# Patient Record
Sex: FEMALE | Race: OTHER | NOT HISPANIC OR LATINO
[De-identification: names, ages, dates, MRNs, and addresses within clinical notes are randomized per-mention and may not be internally consistent; named-entity substitution may affect disease eponyms.]

---

## 2017-01-25 ENCOUNTER — MESSAGE (OUTPATIENT)
Age: 37
End: 2017-01-25

## 2017-01-26 ENCOUNTER — MESSAGE (OUTPATIENT)
Age: 37
End: 2017-01-26

## 2017-01-31 ENCOUNTER — APPOINTMENT (OUTPATIENT)
Dept: NEUROLOGY | Facility: CLINIC | Age: 37
End: 2017-01-31

## 2017-05-02 ENCOUNTER — APPOINTMENT (OUTPATIENT)
Dept: NEUROLOGY | Facility: CLINIC | Age: 37
End: 2017-05-02

## 2017-05-02 ENCOUNTER — MESSAGE (OUTPATIENT)
Age: 37
End: 2017-05-02

## 2017-08-03 ENCOUNTER — APPOINTMENT (OUTPATIENT)
Dept: NEUROLOGY | Facility: CLINIC | Age: 37
End: 2017-08-03

## 2017-08-10 ENCOUNTER — APPOINTMENT (OUTPATIENT)
Dept: NEUROLOGY | Facility: CLINIC | Age: 37
End: 2017-08-10
Payer: MEDICAID

## 2017-08-10 PROCEDURE — 64642 CHEMODENERV 1 EXTREMITY 1-4: CPT

## 2017-08-10 PROCEDURE — 99214 OFFICE O/P EST MOD 30 MIN: CPT | Mod: 25

## 2017-11-15 ENCOUNTER — APPOINTMENT (OUTPATIENT)
Dept: NEUROLOGY | Facility: CLINIC | Age: 37
End: 2017-11-15
Payer: MEDICAID

## 2017-11-15 PROCEDURE — 64642 CHEMODENERV 1 EXTREMITY 1-4: CPT

## 2017-11-15 PROCEDURE — 99214 OFFICE O/P EST MOD 30 MIN: CPT | Mod: 25

## 2018-02-08 ENCOUNTER — MEDICATION RENEWAL (OUTPATIENT)
Age: 38
End: 2018-02-08

## 2018-02-14 ENCOUNTER — APPOINTMENT (OUTPATIENT)
Dept: NEUROLOGY | Facility: CLINIC | Age: 38
End: 2018-02-14
Payer: MEDICAID

## 2018-02-14 PROCEDURE — 99215 OFFICE O/P EST HI 40 MIN: CPT | Mod: 25

## 2018-02-14 PROCEDURE — 64642 CHEMODENERV 1 EXTREMITY 1-4: CPT

## 2018-05-15 ENCOUNTER — APPOINTMENT (OUTPATIENT)
Dept: NEUROLOGY | Facility: CLINIC | Age: 38
End: 2018-05-15
Payer: MEDICAID

## 2018-05-15 PROCEDURE — 99214 OFFICE O/P EST MOD 30 MIN: CPT | Mod: 25

## 2018-05-15 PROCEDURE — 64642 CHEMODENERV 1 EXTREMITY 1-4: CPT

## 2018-08-17 ENCOUNTER — APPOINTMENT (OUTPATIENT)
Dept: NEUROLOGY | Facility: CLINIC | Age: 38
End: 2018-08-17
Payer: MEDICAID

## 2018-08-17 PROCEDURE — 99214 OFFICE O/P EST MOD 30 MIN: CPT | Mod: 25

## 2018-08-17 PROCEDURE — 64642 CHEMODENERV 1 EXTREMITY 1-4: CPT

## 2018-11-15 ENCOUNTER — APPOINTMENT (OUTPATIENT)
Dept: NEUROLOGY | Facility: CLINIC | Age: 38
End: 2018-11-15
Payer: MEDICAID

## 2018-11-15 PROCEDURE — 99214 OFFICE O/P EST MOD 30 MIN: CPT | Mod: 25

## 2018-11-15 PROCEDURE — 64642 CHEMODENERV 1 EXTREMITY 1-4: CPT

## 2019-02-19 ENCOUNTER — APPOINTMENT (OUTPATIENT)
Dept: NEUROLOGY | Facility: CLINIC | Age: 39
End: 2019-02-19
Payer: MEDICAID

## 2019-02-19 PROCEDURE — 64642 CHEMODENERV 1 EXTREMITY 1-4: CPT

## 2019-02-19 PROCEDURE — 99214 OFFICE O/P EST MOD 30 MIN: CPT | Mod: 25

## 2019-02-19 NOTE — END OF VISIT
[>50% of Time Spent on Counseling and Coordination of Care for  ___] : Greater than 50% of the encounter time was spent on counseling and coordination of care for [unfilled] [Time Spent: ___ minutes] : I have spent [unfilled] minutes of face to face time with the patient [FreeTextEntry1] : Sharlene

## 2019-02-19 NOTE — HISTORY OF PRESENT ILLNESS
[FreeTextEntry1] : Patient is a 38 year old right handed woman with hand tremor with a good response from last Botox injections. No side effects, no erythema or redness. Lasted about three months, but for 2 weeks had finger extension weakness.

## 2019-02-19 NOTE — DISCUSSION/SUMMARY
[FreeTextEntry1] : In summary, Mrs. Bryant is a 38 year old right handed woman with hand tremors who comes for follow up Botox injections. She states it worked well last time with no side effects. lasted about three months. The examination was remarkable for bilateral postural tremor, R>L. Botox injections were done as per procedure note. Patient tolerated well the procedure. Follow up in 3 months, sooner if new problems arise.  \par They are thinking of having another child. There is a strong family hx of tremor, so I cannot rule out that the child may get it. As for Btx, will have to check with company.

## 2019-02-19 NOTE — REASON FOR VISIT
[Follow-Up: _____] : a [unfilled] follow-up visit [Family Member] : family member [FreeTextEntry1] : for essential tremor and repeat Botox injections

## 2019-02-19 NOTE — PROCEDURE
[FreeTextEntry1] : The patient received injections with botulinum toxin A (botox), diluted at 5 units/0.1 cc via a 30 ga needle into the following sites, in the usual antiseptic manner. Possible side effects including over-weakening, dysphagia, bleeding, and local infection were discussed.\par \par right extensor carpi radialis muscle: 5/1\par right extensor carpi ulnaris muscle:  5/1\par \par Total injected = 10, Discarded = 90, Total used = 100\par The patient tolerated the procedure well, with not complications

## 2019-02-19 NOTE — PHYSICAL EXAM
[General Appearance - Alert] : alert [General Appearance - In No Acute Distress] : in no acute distress [General Appearance - Well Developed] : well developed [Oriented To Time, Place, And Person] : oriented to person, place, and time [Impaired Insight] : insight and judgment were intact [Affect] : the affect was normal [Person] : oriented to person [Place] : oriented to place [Time] : oriented to time [Motor Handedness Right-Handed] : the patient is right hand dominant [FreeTextEntry1] : Mild bilateral postural tremor mostly in R>L

## 2019-02-19 NOTE — REVIEW OF SYSTEMS
[As Noted in HPI] : as noted in HPI [Negative] : Psychiatric [Feeling Poorly] : not feeling poorly [Hand Weakness] : no hand weakness

## 2019-05-09 ENCOUNTER — MEDICATION RENEWAL (OUTPATIENT)
Age: 39
End: 2019-05-09

## 2019-05-24 ENCOUNTER — APPOINTMENT (OUTPATIENT)
Dept: NEUROLOGY | Facility: CLINIC | Age: 39
End: 2019-05-24
Payer: MEDICAID

## 2019-05-24 PROCEDURE — 99214 OFFICE O/P EST MOD 30 MIN: CPT | Mod: 25

## 2019-05-24 PROCEDURE — 64642 CHEMODENERV 1 EXTREMITY 1-4: CPT

## 2019-05-24 NOTE — DISCUSSION/SUMMARY
[FreeTextEntry1] : In summary, Mrs. Bryant is a 39 year old right handed woman with hand tremors who comes for follow up Botox injections. She states it worked well last time with no side effects. lasted about three months. The examination was remarkable for bilateral postural tremor, R>L. Botox injections were done as per procedure note. Patient tolerated well the procedure. Follow up in 3 months, sooner if new problems arise.  \par \par

## 2019-05-24 NOTE — REVIEW OF SYSTEMS
[Feeling Poorly] : not feeling poorly [As Noted in HPI] : as noted in HPI [Hand Weakness] : no hand weakness [Negative] : Psychiatric

## 2019-05-24 NOTE — HISTORY OF PRESENT ILLNESS
[FreeTextEntry1] : Patient is a 39 year old right handed woman with hand tremor with a good response from last Botox injections. No side effects, no erythema or redness. Lasted about three months

## 2019-05-24 NOTE — PHYSICAL EXAM
[General Appearance - Alert] : alert [General Appearance - Well Developed] : well developed [General Appearance - In No Acute Distress] : in no acute distress [Oriented To Time, Place, And Person] : oriented to person, place, and time [Affect] : the affect was normal [Impaired Insight] : insight and judgment were intact [Person] : oriented to person [Place] : oriented to place [Time] : oriented to time [Motor Handedness Right-Handed] : the patient is right hand dominant [FreeTextEntry1] : Mild bilateral postural tremor mostly in R>L

## 2019-07-30 ENCOUNTER — RESULT REVIEW (OUTPATIENT)
Age: 39
End: 2019-07-30

## 2019-08-28 ENCOUNTER — APPOINTMENT (OUTPATIENT)
Dept: NEUROLOGY | Facility: CLINIC | Age: 39
End: 2019-08-28
Payer: MEDICAID

## 2019-08-28 VITALS
HEART RATE: 81 BPM | SYSTOLIC BLOOD PRESSURE: 126 MMHG | HEIGHT: 62 IN | WEIGHT: 135 LBS | BODY MASS INDEX: 24.84 KG/M2 | DIASTOLIC BLOOD PRESSURE: 84 MMHG

## 2019-08-28 PROCEDURE — 64642 CHEMODENERV 1 EXTREMITY 1-4: CPT

## 2019-08-28 PROCEDURE — 99214 OFFICE O/P EST MOD 30 MIN: CPT | Mod: 25

## 2019-12-04 ENCOUNTER — APPOINTMENT (OUTPATIENT)
Dept: NEUROLOGY | Facility: CLINIC | Age: 39
End: 2019-12-04
Payer: MEDICAID

## 2019-12-04 PROCEDURE — 99214 OFFICE O/P EST MOD 30 MIN: CPT | Mod: 25

## 2019-12-04 PROCEDURE — 64642 CHEMODENERV 1 EXTREMITY 1-4: CPT

## 2019-12-04 NOTE — END OF VISIT
[] : Fellow [Fellow] : Fellow [FreeTextEntry2] : I was present during the entire procedure and assisted the Fellow where needed\par  [FreeTextEntry1] : Sharlene

## 2019-12-04 NOTE — HISTORY OF PRESENT ILLNESS
[FreeTextEntry1] : Patient is a 39 year old right handed woman with hand tremor with a good response from last Botox injections. No side effects, no erythema or redness. Lasted about three months. Her tremors are less and her writing has improved as well.

## 2019-12-04 NOTE — PHYSICAL EXAM
[General Appearance - Alert] : alert [General Appearance - Well Developed] : well developed [General Appearance - In No Acute Distress] : in no acute distress [Oriented To Time, Place, And Person] : oriented to person, place, and time [Affect] : the affect was normal [Impaired Insight] : insight and judgment were intact [Person] : oriented to person [Place] : oriented to place [Time] : oriented to time [Motor Handedness Right-Handed] : the patient is right hand dominant [FreeTextEntry1] : Mild bilateral postural tremor mostly in R>L, but since injections, right tremors are less than left.

## 2020-03-04 ENCOUNTER — APPOINTMENT (OUTPATIENT)
Dept: SPINE | Facility: CLINIC | Age: 40
End: 2020-03-04
Payer: MEDICAID

## 2020-03-04 ENCOUNTER — APPOINTMENT (OUTPATIENT)
Dept: NEUROLOGY | Facility: CLINIC | Age: 40
End: 2020-03-04
Payer: MEDICAID

## 2020-03-04 VITALS
OXYGEN SATURATION: 97 % | HEART RATE: 77 BPM | BODY MASS INDEX: 30 KG/M2 | TEMPERATURE: 98 F | WEIGHT: 163 LBS | SYSTOLIC BLOOD PRESSURE: 131 MMHG | HEIGHT: 62 IN | DIASTOLIC BLOOD PRESSURE: 79 MMHG

## 2020-03-04 PROCEDURE — 64642 CHEMODENERV 1 EXTREMITY 1-4: CPT

## 2020-03-04 PROCEDURE — 99204 OFFICE O/P NEW MOD 45 MIN: CPT

## 2020-03-04 PROCEDURE — 99214 OFFICE O/P EST MOD 30 MIN: CPT | Mod: 25

## 2020-03-04 RX ORDER — TRIAMCINOLONE ACETONIDE 1 MG/G
0.1 CREAM TOPICAL
Refills: 0 | Status: DISCONTINUED | COMMUNITY
Start: 2017-08-22 | End: 2020-03-04

## 2020-03-04 RX ORDER — PRIMIDONE 50 MG/1
50 TABLET ORAL TWICE DAILY
Qty: 90 | Refills: 3 | Status: DISCONTINUED | COMMUNITY
Start: 2017-05-02 | End: 2020-03-04

## 2020-03-04 NOTE — PHYSICAL EXAM
[General Appearance - Alert] : alert [General Appearance - In No Acute Distress] : in no acute distress [General Appearance - Well Developed] : well developed [Oriented To Time, Place, And Person] : oriented to person, place, and time [Impaired Insight] : insight and judgment were intact [Affect] : the affect was normal [Person] : oriented to person [Place] : oriented to place [Time] : oriented to time [Motor Handedness Right-Handed] : the patient is right hand dominant [FreeTextEntry1] : Mild bilateral postural tremor mostly in R>L, but since injections, right tremors are less than left.

## 2020-03-04 NOTE — DISCUSSION/SUMMARY
[FreeTextEntry1] : In summary, Mrs. Bryant is a 39 year old right handed woman with hand tremors who comes for follow up Botox injections. She states it worked well last time with no side effects. Lasted about three months. The examination was remarkable for bilateral postural tremor, R>L, more left now that she has been receiving botox injections. Botox injections were done as per procedure note. Patient tolerated well the procedure. Follow up in 3 months, sooner if new problems arise.  \par \par

## 2020-03-13 ENCOUNTER — APPOINTMENT (OUTPATIENT)
Dept: MRI IMAGING | Facility: CLINIC | Age: 40
End: 2020-03-13
Payer: MEDICAID

## 2020-03-13 ENCOUNTER — OUTPATIENT (OUTPATIENT)
Dept: OUTPATIENT SERVICES | Facility: HOSPITAL | Age: 40
LOS: 1 days | End: 2020-03-13
Payer: MEDICAID

## 2020-03-13 DIAGNOSIS — D32.9 BENIGN NEOPLASM OF MENINGES, UNSPECIFIED: ICD-10-CM

## 2020-03-13 PROCEDURE — A9585: CPT

## 2020-03-13 PROCEDURE — 70553 MRI BRAIN STEM W/O & W/DYE: CPT | Mod: 26

## 2020-03-13 PROCEDURE — 70553 MRI BRAIN STEM W/O & W/DYE: CPT

## 2020-04-01 ENCOUNTER — APPOINTMENT (OUTPATIENT)
Dept: SPINE | Facility: CLINIC | Age: 40
End: 2020-04-01
Payer: MEDICAID

## 2020-04-01 PROCEDURE — 99213 OFFICE O/P EST LOW 20 MIN: CPT | Mod: 95

## 2020-04-01 NOTE — REASON FOR VISIT
[Follow-Up: _____] : a [unfilled] follow-up visit [FreeTextEntry1] : The patient is being seen via telehealth for review of a new MRI for surveillance of a foramen magnum meningioma.

## 2020-05-19 ENCOUNTER — APPOINTMENT (OUTPATIENT)
Dept: NEUROLOGY | Facility: CLINIC | Age: 40
End: 2020-05-19

## 2020-06-10 ENCOUNTER — APPOINTMENT (OUTPATIENT)
Dept: NEUROLOGY | Facility: CLINIC | Age: 40
End: 2020-06-10
Payer: MEDICAID

## 2020-06-10 VITALS — TEMPERATURE: 97.8 F

## 2020-06-10 PROCEDURE — 64642 CHEMODENERV 1 EXTREMITY 1-4: CPT

## 2020-06-10 PROCEDURE — 99214 OFFICE O/P EST MOD 30 MIN: CPT | Mod: 25

## 2020-06-10 NOTE — PHYSICAL EXAM
[Person] : oriented to person [Place] : oriented to place [Motor Handedness Right-Handed] : the patient is right hand dominant [Time] : oriented to time [FreeTextEntry1] : Mild bilateral postural tremor mostly in R>L, but since injections, right tremors are less than left. No myoclonus (including startle myoclonus) seen. Gait normal.

## 2020-06-10 NOTE — HISTORY OF PRESENT ILLNESS
[FreeTextEntry1] : Patient is a 40 year old right handed woman with hand tremor with a good response from last Botox injections. No side effects, no erythema or redness. Lasted about three months. Her tremors are less and her writing has improved as well.\par Her foramen magnum meningioma is stable. for past three weeks she has occasional whole body jerks when sitting (watching TV and such).

## 2020-06-10 NOTE — REASON FOR VISIT
[Family Member] : family member [Follow-Up: _____] : a [unfilled] follow-up visit [FreeTextEntry1] : for essential tremor and repeat Botox injections

## 2020-06-10 NOTE — DISCUSSION/SUMMARY
[FreeTextEntry1] : In summary, Mrs. Bryant is a 39 year old right handed woman with hand tremors who comes for follow up Botox injections. She states it worked well last time with no side effects. Lasted about three months. The examination was remarkable for bilateral postural tremor, R>L, more left now that she has been receiving botox injections. Botox injections were done as per procedure note. Patient tolerated well the procedure. Follow up in 3 months, sooner if new problems arise. As for the myoclonus, exam normal, will monitor\par \par We discussed the above impression, plan and recommendations during the visit. Counseling represented more then 50% of the 30 minute visit time\par \par \par

## 2020-09-08 ENCOUNTER — APPOINTMENT (OUTPATIENT)
Dept: NEUROLOGY | Facility: CLINIC | Age: 40
End: 2020-09-08
Payer: MEDICAID

## 2020-09-08 VITALS — TEMPERATURE: 97.2 F

## 2020-09-08 PROCEDURE — 64642 CHEMODENERV 1 EXTREMITY 1-4: CPT

## 2020-09-08 PROCEDURE — 99213 OFFICE O/P EST LOW 20 MIN: CPT | Mod: 25

## 2020-09-08 NOTE — HISTORY OF PRESENT ILLNESS
[FreeTextEntry1] : Patient is a 40 year old right handed woman with hand tremor with a good response from last Botox injections. No side effects, no erythema or redness. Lasted about three months. Her tremors are less and her writing has improved as well.\par \par Her foramen magnum meningioma is stable.

## 2020-09-08 NOTE — PHYSICAL EXAM
[Person] : oriented to person [Place] : oriented to place [Time] : oriented to time [Motor Handedness Right-Handed] : the patient is right hand dominant [FreeTextEntry1] : Mild bilateral postural tremor mostly in R>L, but since injections, right tremors are less than left. No myoclonus (including startle myoclonus) seen. Gait normal.

## 2020-09-08 NOTE — DISCUSSION/SUMMARY
[FreeTextEntry1] : In summary, Mrs. Bryant is a 40 year old right handed woman with hand tremors who comes for follow up Botox injections. She states it worked well last time with no side effects. Lasted about three months. The examination was remarkable for bilateral postural tremor, R>L, more left now that she has been receiving botox injections. Botox injections were done as per procedure note. Patient tolerated well the procedure. Follow up in 3 months, sooner if new problems arise. As for the myoclonus, exam normal and seems to have improved, will continue to monitor\par \par We discussed the above impression, plan and recommendations during the visit. Counseling represented more then 50% of the 20 minute visit time\par \par \par

## 2020-11-02 ENCOUNTER — OUTPATIENT (OUTPATIENT)
Dept: OUTPATIENT SERVICES | Facility: HOSPITAL | Age: 40
LOS: 1 days | End: 2020-11-02
Payer: MEDICAID

## 2020-11-02 ENCOUNTER — APPOINTMENT (OUTPATIENT)
Dept: MRI IMAGING | Facility: CLINIC | Age: 40
End: 2020-11-02
Payer: MEDICAID

## 2020-11-02 ENCOUNTER — RESULT REVIEW (OUTPATIENT)
Age: 40
End: 2020-11-02

## 2020-11-02 DIAGNOSIS — D32.9 BENIGN NEOPLASM OF MENINGES, UNSPECIFIED: ICD-10-CM

## 2020-11-02 PROCEDURE — A9585: CPT

## 2020-11-02 PROCEDURE — 70553 MRI BRAIN STEM W/O & W/DYE: CPT

## 2020-11-02 PROCEDURE — 70553 MRI BRAIN STEM W/O & W/DYE: CPT | Mod: 26

## 2020-11-11 ENCOUNTER — APPOINTMENT (OUTPATIENT)
Dept: SPINE | Facility: CLINIC | Age: 40
End: 2020-11-11
Payer: MEDICAID

## 2020-11-11 VITALS
WEIGHT: 161 LBS | SYSTOLIC BLOOD PRESSURE: 130 MMHG | BODY MASS INDEX: 29.63 KG/M2 | HEART RATE: 82 BPM | DIASTOLIC BLOOD PRESSURE: 84 MMHG | HEIGHT: 62 IN | TEMPERATURE: 97.6 F

## 2020-11-11 PROCEDURE — 99072 ADDL SUPL MATRL&STAF TM PHE: CPT

## 2020-11-11 PROCEDURE — 99213 OFFICE O/P EST LOW 20 MIN: CPT

## 2020-11-12 NOTE — DATA REVIEWED
[de-identified] : Brain with and without contrast done at [facility] on 11/2/2020 and 3/13/2020 and [place] on 11/5/2019

## 2020-11-12 NOTE — PHYSICAL EXAM

## 2020-11-12 NOTE — ASSESSMENT
[FreeTextEntry1] : The images and findings were reviewed and discussed with the patient.  The patient is to return in one year with a new MRI of the brain with and without contrast for surveillance.

## 2020-11-12 NOTE — RESULTS/DATA
[FreeTextEntry1] : No significant change in the posterior foramen magnum meningioma which is seen better on the sagittal views done with contrast.

## 2020-11-12 NOTE — REASON FOR VISIT
[Follow-Up: _____] : a [unfilled] follow-up visit [FreeTextEntry1] : JAROD LOZANO is a 40 year old lady who presented with tinnitus and was found to have an incidentally found small posterior foramen magnum of meningioma. The patient stated that she remains with some right ear tinnitus. She also receives Botox injections by Dr. Boston Su to treat tremors in her hands.  She otherwise stated that she is feeling well.

## 2020-12-08 ENCOUNTER — APPOINTMENT (OUTPATIENT)
Dept: NEUROLOGY | Facility: CLINIC | Age: 40
End: 2020-12-08

## 2020-12-15 ENCOUNTER — APPOINTMENT (OUTPATIENT)
Dept: NEUROLOGY | Facility: CLINIC | Age: 40
End: 2020-12-15
Payer: MEDICAID

## 2020-12-15 VITALS — TEMPERATURE: 97.2 F

## 2020-12-15 PROCEDURE — 99072 ADDL SUPL MATRL&STAF TM PHE: CPT

## 2020-12-15 PROCEDURE — 99214 OFFICE O/P EST MOD 30 MIN: CPT | Mod: 25

## 2020-12-15 PROCEDURE — 64642 CHEMODENERV 1 EXTREMITY 1-4: CPT

## 2020-12-15 NOTE — DISCUSSION/SUMMARY
[FreeTextEntry1] : In summary, Mrs. Bryant is a 40 year old right handed woman with dystonia.\par \par Botox injections were done as per procedure note. Patient tolerated well the procedure. Follow up in 3 months, sooner if new problems arise. As for the myoclonus, exam normal and seems to have improved, will continue to monitor. Her meningioma also has been stable. \par \par We discussed the above impression, plan and recommendations during the visit. Counseling represented more then 50% of the 30 minute visit time

## 2020-12-15 NOTE — HISTORY OF PRESENT ILLNESS
[FreeTextEntry1] : Patient is a 40 year old right handed woman with hand tremor with a good response from last Botox injections. No side effects, no erythema or redness. Lasted about three months. Her tremors are less and her writing has improved as well.\par \par Her foramen magnum meningioma is stable on recent imaging.

## 2021-03-17 ENCOUNTER — APPOINTMENT (OUTPATIENT)
Dept: NEUROLOGY | Facility: CLINIC | Age: 41
End: 2021-03-17
Payer: MEDICAID

## 2021-03-17 VITALS
BODY MASS INDEX: 29.63 KG/M2 | SYSTOLIC BLOOD PRESSURE: 116 MMHG | HEIGHT: 62 IN | DIASTOLIC BLOOD PRESSURE: 79 MMHG | WEIGHT: 161 LBS | HEART RATE: 84 BPM

## 2021-03-17 VITALS — TEMPERATURE: 97.4 F

## 2021-03-17 PROCEDURE — 99214 OFFICE O/P EST MOD 30 MIN: CPT | Mod: 25

## 2021-03-17 PROCEDURE — 99072 ADDL SUPL MATRL&STAF TM PHE: CPT

## 2021-03-17 PROCEDURE — 64642 CHEMODENERV 1 EXTREMITY 1-4: CPT

## 2021-03-17 NOTE — HISTORY OF PRESENT ILLNESS
[FreeTextEntry1] : Patient is a 40 year old right handed woman with hand tremor with a good response from last Botox injections. \par \par No side effects, no erythema or redness. Lasted about three months. Her tremors are less and her writing has improved as well.\par \par Her foramen magnum meningioma is stable on recent imaging.

## 2021-03-17 NOTE — PHYSICAL EXAM
Urinary Tract Infection in Women: Care Instructions  Your Care Instructions    A urinary tract infection, or UTI, is a general term for an infection anywhere between the kidneys and the urethra (where urine comes out). Most UTIs are bladder infections. They often cause pain or burning when you urinate. UTIs are caused by bacteria and can be cured with antibiotics. Be sure to complete your treatment so that the infection goes away. Follow-up care is a key part of your treatment and safety. Be sure to make and go to all appointments, and call your doctor if you are having problems. It's also a good idea to know your test results and keep a list of the medicines you take. How can you care for yourself at home? · Take your antibiotics as directed. Do not stop taking them just because you feel better. You need to take the full course of antibiotics. · Drink extra water and other fluids for the next day or two. This may help wash out the bacteria that are causing the infection. (If you have kidney, heart, or liver disease and have to limit fluids, talk with your doctor before you increase your fluid intake.)  · Avoid drinks that are carbonated or have caffeine. They can irritate the bladder. · Urinate often. Try to empty your bladder each time. · To relieve pain, take a hot bath or lay a heating pad set on low over your lower belly or genital area. Never go to sleep with a heating pad in place. To prevent UTIs  · Drink plenty of water each day. This helps you urinate often, which clears bacteria from your system. (If you have kidney, heart, or liver disease and have to limit fluids, talk with your doctor before you increase your fluid intake.)  · Urinate when you need to. · Urinate right after you have sex. · Change sanitary pads often. · Avoid douches, bubble baths, feminine hygiene sprays, and other feminine hygiene products that have deodorants.   · After going to the bathroom, wipe from front to back.  When should you call for help? Call your doctor now or seek immediate medical care if:    · Symptoms such as fever, chills, nausea, or vomiting get worse or appear for the first time.     · You have new pain in your back just below your rib cage. This is called flank pain.     · There is new blood or pus in your urine.     · You have any problems with your antibiotic medicine.    Watch closely for changes in your health, and be sure to contact your doctor if:    · You are not getting better after taking an antibiotic for 2 days.     · Your symptoms go away but then come back. Where can you learn more? Go to http://mona-chele.info/. Enter Y936 in the search box to learn more about \"Urinary Tract Infection in Women: Care Instructions. \"  Current as of: May 12, 2017  Content Version: 11.7  © 2185-6485 [employer]. Care instructions adapted under license by [employer] (which disclaims liability or warranty for this information). If you have questions about a medical condition or this instruction, always ask your healthcare professional. [address] any warranty or liability for your use of this information. [Person] : oriented to person [Place] : oriented to place [Time] : oriented to time [Motor Handedness Right-Handed] : the patient is right hand dominant [FreeTextEntry1] : Mild bilateral postural tremor mostly in R>L, but since injections, right tremors are less than left. No myoclonus (including startle myoclonus) seen. Gait normal.

## 2021-06-23 ENCOUNTER — APPOINTMENT (OUTPATIENT)
Dept: NEUROLOGY | Facility: CLINIC | Age: 41
End: 2021-06-23
Payer: MEDICAID

## 2021-06-23 PROCEDURE — 64642 CHEMODENERV 1 EXTREMITY 1-4: CPT

## 2021-06-23 PROCEDURE — 99213 OFFICE O/P EST LOW 20 MIN: CPT | Mod: 25

## 2021-06-23 NOTE — DISCUSSION/SUMMARY
[FreeTextEntry1] : In summary, Mrs. Bryant is a 41 year old right handed woman with dystonia.\par \par Botox injections were done as per procedure note. Patient tolerated well the procedure. Follow up in 3 months, sooner if new problems arise. As for the myoclonus, exam normal and seems to have improved, will continue to monitor. Her meningioma also has been stable. \par \par We discussed the above impression, plan and recommendations during the visit. Counseling represented more then 50% of the 20 minute visit time

## 2021-08-11 ENCOUNTER — RX RENEWAL (OUTPATIENT)
Age: 41
End: 2021-08-11

## 2021-10-06 ENCOUNTER — APPOINTMENT (OUTPATIENT)
Dept: NEUROLOGY | Facility: CLINIC | Age: 41
End: 2021-10-06
Payer: MEDICAID

## 2021-10-06 PROCEDURE — 64642 CHEMODENERV 1 EXTREMITY 1-4: CPT

## 2021-10-06 PROCEDURE — 99213 OFFICE O/P EST LOW 20 MIN: CPT | Mod: 25

## 2021-10-06 NOTE — HISTORY OF PRESENT ILLNESS
[FreeTextEntry1] : Patient is a 41 year old right handed woman with hand tremor with a good response from last Botox injections. \par \par Last visit she had some finger weakness, did resolve after 2 weeks (this has happened before). no erythema or redness. Lasted about three months. Her tremors are less and her writing has improved as well.\par \par Her foramen magnum meningioma is stable on recent imaging.

## 2021-10-14 ENCOUNTER — OUTPATIENT (OUTPATIENT)
Dept: OUTPATIENT SERVICES | Facility: HOSPITAL | Age: 41
LOS: 1 days | End: 2021-10-14
Payer: MEDICAID

## 2021-10-14 ENCOUNTER — APPOINTMENT (OUTPATIENT)
Dept: MRI IMAGING | Facility: CLINIC | Age: 41
End: 2021-10-14

## 2021-10-14 DIAGNOSIS — D32.9 BENIGN NEOPLASM OF MENINGES, UNSPECIFIED: ICD-10-CM

## 2021-10-14 PROCEDURE — 70553 MRI BRAIN STEM W/O & W/DYE: CPT

## 2021-10-14 PROCEDURE — 70553 MRI BRAIN STEM W/O & W/DYE: CPT | Mod: 26

## 2021-10-27 ENCOUNTER — APPOINTMENT (OUTPATIENT)
Dept: SPINE | Facility: CLINIC | Age: 41
End: 2021-10-27
Payer: MEDICAID

## 2021-10-27 VITALS
WEIGHT: 163 LBS | HEART RATE: 89 BPM | HEIGHT: 62 IN | OXYGEN SATURATION: 97 % | BODY MASS INDEX: 30 KG/M2 | DIASTOLIC BLOOD PRESSURE: 89 MMHG | SYSTOLIC BLOOD PRESSURE: 131 MMHG

## 2021-10-27 PROCEDURE — 99213 OFFICE O/P EST LOW 20 MIN: CPT

## 2021-11-17 ENCOUNTER — APPOINTMENT (OUTPATIENT)
Dept: SPINE | Facility: CLINIC | Age: 41
End: 2021-11-17

## 2021-12-31 ENCOUNTER — EMERGENCY (EMERGENCY)
Facility: HOSPITAL | Age: 41
LOS: 1 days | Discharge: HOME | End: 2021-12-31
Attending: EMERGENCY MEDICINE | Admitting: EMERGENCY MEDICINE
Payer: OTHER MISCELLANEOUS

## 2021-12-31 VITALS
DIASTOLIC BLOOD PRESSURE: 78 MMHG | RESPIRATION RATE: 16 BRPM | TEMPERATURE: 98 F | HEART RATE: 81 BPM | SYSTOLIC BLOOD PRESSURE: 142 MMHG | OXYGEN SATURATION: 99 %

## 2021-12-31 VITALS
HEART RATE: 88 BPM | HEIGHT: 62 IN | RESPIRATION RATE: 18 BRPM | SYSTOLIC BLOOD PRESSURE: 134 MMHG | DIASTOLIC BLOOD PRESSURE: 82 MMHG | TEMPERATURE: 98 F | OXYGEN SATURATION: 99 %

## 2021-12-31 PROCEDURE — 73090 X-RAY EXAM OF FOREARM: CPT | Mod: 26,RT

## 2021-12-31 PROCEDURE — 99284 EMERGENCY DEPT VISIT MOD MDM: CPT

## 2021-12-31 PROCEDURE — 72100 X-RAY EXAM L-S SPINE 2/3 VWS: CPT | Mod: 26

## 2021-12-31 RX ORDER — ACETAMINOPHEN 500 MG
650 TABLET ORAL ONCE
Refills: 0 | Status: COMPLETED | OUTPATIENT
Start: 2021-12-31 | End: 2021-12-31

## 2021-12-31 RX ORDER — LIDOCAINE 4 G/100G
1 CREAM TOPICAL ONCE
Refills: 0 | Status: COMPLETED | OUTPATIENT
Start: 2021-12-31 | End: 2021-12-31

## 2021-12-31 RX ADMIN — Medication 650 MILLIGRAM(S): at 17:52

## 2021-12-31 RX ADMIN — LIDOCAINE 1 PATCH: 4 CREAM TOPICAL at 16:34

## 2021-12-31 RX ADMIN — Medication 650 MILLIGRAM(S): at 16:33

## 2021-12-31 NOTE — ED PROVIDER NOTE - PHYSICAL EXAMINATION
pt sitting up, able to stand and  walk in RW without any difficulty  mmm. no sign of head/facial trauma  normal S1-S2  No resp distress. able to speak in full and clear sentences. no wheeze, rales or stridor.  soft nontender abdomen. no  rebound. no guarding. no sign of trauma. no CVAT  stable pelvis  nontender cervical/thoracic spine. tender on the lower lumbar spine and on the right para-lumbar spine. no step off. no bruise or ecchymosis of back  no lac of arms/face  nl gait  nl  bl hands  tender on the right forearm w no deformity or swelling

## 2021-12-31 NOTE — ED ADULT NURSE NOTE - OBJECTIVE STATEMENT
Pt awake, alert and oriented x 4 c/o back pain since shelf fell on her today.   no numbness/tingling, no neuro deficits, ambulatory.     meds given.

## 2021-12-31 NOTE — ED PROVIDER NOTE - OBJECTIVE STATEMENT
40yo F denies sig pmhx pw pain of back after a shelf fell this morning. pt was hanging a shelf w glass candles, it fell, states it hit her back. She twisted,  without falling. Has been walking. Endorse pain in lower back and right arm. NO head trauma. no neck pain. no cp/sob no abdominal pain. no numbness or weakness. no hip pain. no AC. no urinary complaints.

## 2021-12-31 NOTE — ED ADULT TRIAGE NOTE - CHIEF COMPLAINT QUOTE
Patient brought to ER by EMS from work and a shelf fell and landed on her left hip/ C/O left hip and back pain. Took Tyl 500mg

## 2021-12-31 NOTE — ED PROVIDER NOTE - PATIENT PORTAL LINK FT
You can access the FollowMyHealth Patient Portal offered by [facility] by registering at the following website: [email or web address]. By joining [facility]’s FollowMyHealth portal, you will also be able to view your health information using other applications (apps) compatible with our system.

## 2022-02-02 ENCOUNTER — APPOINTMENT (OUTPATIENT)
Dept: NEUROLOGY | Facility: CLINIC | Age: 42
End: 2022-02-02
Payer: MEDICAID

## 2022-02-02 VITALS
SYSTOLIC BLOOD PRESSURE: 146 MMHG | DIASTOLIC BLOOD PRESSURE: 86 MMHG | HEIGHT: 62 IN | HEART RATE: 118 BPM | BODY MASS INDEX: 30 KG/M2 | OXYGEN SATURATION: 97 % | WEIGHT: 163 LBS

## 2022-02-02 PROCEDURE — 99213 OFFICE O/P EST LOW 20 MIN: CPT | Mod: 25

## 2022-02-02 PROCEDURE — 64642 CHEMODENERV 1 EXTREMITY 1-4: CPT

## 2022-02-02 NOTE — HISTORY OF PRESENT ILLNESS
[FreeTextEntry1] : Patient is a 41 year old right handed woman with hand tremor with a good response from last Botox injections. \par \par Last visit no finger weakness (this has happened before). no erythema or redness. Lasted about three months. Her tremors are less and her writing has improved as well.\par \par Her foramen magnum meningioma is stable on recent imaging.

## 2022-05-18 ENCOUNTER — APPOINTMENT (OUTPATIENT)
Dept: NEUROLOGY | Facility: CLINIC | Age: 42
End: 2022-05-18
Payer: MEDICAID

## 2022-05-18 PROCEDURE — 99213 OFFICE O/P EST LOW 20 MIN: CPT | Mod: 25

## 2022-05-18 PROCEDURE — 64642 CHEMODENERV 1 EXTREMITY 1-4: CPT

## 2022-05-18 NOTE — HISTORY OF PRESENT ILLNESS
[FreeTextEntry1] : Patient is a 42 year old right handed woman with hand tremor with a good response from last Botox injections. \par \par Last visit no finger weakness (this has happened before). No erythema or redness. Lasted about three months. Her tremors are less and her writing has improved as well.\par \par Her foramen magnum meningioma is stable on recent imaging.

## 2022-05-18 NOTE — DISCUSSION/SUMMARY
[FreeTextEntry1] : In summary, Mrs. Bryant is a 42 year old right handed woman with dystonia.\par \par Botox injections were done as per procedure note. Patient tolerated well the procedure. Follow up in 3 months, sooner if new problems arise. As for the myoclonus, exam normal and seems to have improved, will continue to monitor. Her meningioma also has been stable. \par \par We discussed the above impression, plan and recommendations during the visit. Counseling represented more then 50% of the 20 minute visit time

## 2022-08-24 ENCOUNTER — APPOINTMENT (OUTPATIENT)
Dept: NEUROLOGY | Facility: CLINIC | Age: 42
End: 2022-08-24

## 2022-08-24 PROCEDURE — 99213 OFFICE O/P EST LOW 20 MIN: CPT | Mod: 25

## 2022-08-24 PROCEDURE — 64642 CHEMODENERV 1 EXTREMITY 1-4: CPT

## 2022-08-24 NOTE — HISTORY OF PRESENT ILLNESS
[FreeTextEntry1] : Patient is a 42 year old right handed woman with hand tremor with a good response from last Botox injections. \par \par Last visit no finger weakness (this has happened before). No erythema or redness. Lasted about three months. Her tremors are less and her writing has improved as well.\par \par Her foramen magnum meningioma is stable on recent imaging. Has been getting more migraines, got Ajovy, which is working.

## 2022-08-24 NOTE — DISCUSSION/SUMMARY
[FreeTextEntry1] : In summary, Mrs. Bryant is a 42 year old right handed woman with dystonia.  Botox injections were done as per procedure note. Patient tolerated well the procedure. Follow up in 3 months, sooner if new problems arise. As for the myoclonus, exam normal and seems to have improved, will continue to monitor. Her meningioma also has been stable. H  We discussed the above impression, plan and recommendations during the visit. Counseling represented more then 50% of the 20 minute visit time

## 2022-10-18 ENCOUNTER — OUTPATIENT (OUTPATIENT)
Dept: OUTPATIENT SERVICES | Facility: HOSPITAL | Age: 42
LOS: 1 days | End: 2022-10-18
Payer: MEDICAID

## 2022-10-18 ENCOUNTER — APPOINTMENT (OUTPATIENT)
Dept: MRI IMAGING | Facility: CLINIC | Age: 42
End: 2022-10-18

## 2022-10-18 DIAGNOSIS — D32.9 BENIGN NEOPLASM OF MENINGES, UNSPECIFIED: ICD-10-CM

## 2022-10-18 PROCEDURE — 70553 MRI BRAIN STEM W/O & W/DYE: CPT

## 2022-10-18 PROCEDURE — A9585: CPT

## 2022-10-18 PROCEDURE — 70553 MRI BRAIN STEM W/O & W/DYE: CPT | Mod: 26

## 2022-10-26 ENCOUNTER — APPOINTMENT (OUTPATIENT)
Dept: SPINE | Facility: CLINIC | Age: 42
End: 2022-10-26

## 2022-10-26 VITALS
HEART RATE: 72 BPM | SYSTOLIC BLOOD PRESSURE: 145 MMHG | WEIGHT: 163 LBS | BODY MASS INDEX: 30 KG/M2 | OXYGEN SATURATION: 100 % | HEIGHT: 62 IN | DIASTOLIC BLOOD PRESSURE: 92 MMHG

## 2022-10-26 PROCEDURE — 99213 OFFICE O/P EST LOW 20 MIN: CPT

## 2022-11-29 ENCOUNTER — APPOINTMENT (OUTPATIENT)
Dept: NEUROLOGY | Facility: CLINIC | Age: 42
End: 2022-11-29

## 2022-11-29 PROCEDURE — 64642 CHEMODENERV 1 EXTREMITY 1-4: CPT

## 2022-11-29 PROCEDURE — 99213 OFFICE O/P EST LOW 20 MIN: CPT | Mod: 25

## 2022-11-29 NOTE — DISCUSSION/SUMMARY
[FreeTextEntry1] : In summary, Mrs. Bryant is a 42 year old right handed woman with dystonia.  Botox injections were done as per procedure note. Patient tolerated well the procedure. Follow up in 3 months, sooner if new problems arise.\par  As for the myoclonus, exam normal and seems to have improved, will continue to monitor. Her meningioma also has been stable. We discussed the above impression, plan and recommendations during the visit. \par \par Counseling represented more then 50% of the 20 minute visit time

## 2022-11-29 NOTE — HISTORY OF PRESENT ILLNESS
[FreeTextEntry1] : Patient is a 42 year old right handed woman with hand tremor with a good response from last Botox injections. \par \par Last visit no finger weakness (this has happened before). No erythema or redness. Lasted about three months. Her tremors are less and her writing has improved as well.\par \par Her foramen magnum meningioma is stable on recent imaging. Has been getting more migraines, got Ajovy, which is working. Given monthly by her .

## 2022-11-30 ENCOUNTER — APPOINTMENT (OUTPATIENT)
Dept: NEUROLOGY | Facility: CLINIC | Age: 42
End: 2022-11-30

## 2023-03-02 NOTE — PHYSICAL EXAM
Quality 110: Preventive Care And Screening: Influenza Immunization: Influenza immunization was not ordered or administered, reason not given [Person] : oriented to person Quality 431: Preventive Care And Screening: Unhealthy Alcohol Use - Screening: Patient not identified as an unhealthy alcohol user when screened for unhealthy alcohol use using a systematic screening method [Place] : oriented to place [Time] : oriented to time [Motor Handedness Right-Handed] : the patient is right hand dominant Quality 130: Documentation Of Current Medications In The Medical Record: Current Medications Documented [FreeTextEntry1] : Mild bilateral postural tremor mostly in R>L, but since injections, right tremors are less than left. No myoclonus (including startle myoclonus) seen. Gait normal.  Detail Level: Detailed Quality 226: Preventive Care And Screening: Tobacco Use: Screening And Cessation Intervention: Patient screened for tobacco use and is an ex/non-smoker

## 2023-03-22 ENCOUNTER — APPOINTMENT (OUTPATIENT)
Dept: NEUROLOGY | Facility: CLINIC | Age: 43
End: 2023-03-22
Payer: MEDICAID

## 2023-03-22 PROCEDURE — 64642 CHEMODENERV 1 EXTREMITY 1-4: CPT

## 2023-03-22 PROCEDURE — 99213 OFFICE O/P EST LOW 20 MIN: CPT | Mod: 25

## 2023-03-22 NOTE — HISTORY OF PRESENT ILLNESS
[FreeTextEntry1] : Patient is a 42 year old right handed woman with hand tremor with a good response from last Botox injections. \par \par Last visit no finger weakness (this has happened before). No erythema or redness. Lasted about three months. Her tremors are less and her writing has improved as well. Tremor is worse with anxiety. \par \par Her foramen magnum meningioma is stable on recent imaging. Has been getting more migraines, got Ajovy, which is working. Given monthly by her .

## 2023-04-06 RX ORDER — CITALOPRAM HYDROBROMIDE 10 MG/1
10 TABLET, FILM COATED ORAL DAILY
Qty: 30 | Refills: 5 | Status: DISCONTINUED | COMMUNITY
Start: 2023-03-22 | End: 2023-04-06

## 2023-07-05 ENCOUNTER — APPOINTMENT (OUTPATIENT)
Dept: NEUROLOGY | Facility: CLINIC | Age: 43
End: 2023-07-05
Payer: MEDICAID

## 2023-07-05 VITALS
SYSTOLIC BLOOD PRESSURE: 118 MMHG | BODY MASS INDEX: 29.44 KG/M2 | HEART RATE: 86 BPM | WEIGHT: 160 LBS | HEIGHT: 62 IN | DIASTOLIC BLOOD PRESSURE: 81 MMHG

## 2023-07-05 DIAGNOSIS — F32.A ANXIETY DISORDER, UNSPECIFIED: ICD-10-CM

## 2023-07-05 DIAGNOSIS — F41.9 ANXIETY DISORDER, UNSPECIFIED: ICD-10-CM

## 2023-07-05 PROCEDURE — 64642 CHEMODENERV 1 EXTREMITY 1-4: CPT

## 2023-07-05 PROCEDURE — 99213 OFFICE O/P EST LOW 20 MIN: CPT | Mod: 25

## 2023-07-05 NOTE — DISCUSSION/SUMMARY
[FreeTextEntry1] : In summary, Mrs. Bryant is a 42 year old right handed woman with dystonia.  Botox injections were done as per procedure note. Patient tolerated well the procedure. Follow up in 3 months, sooner if new problems arise.\par \par  As for the myoclonus, exam normal and seems to have improved, will continue to monitor. Her meningioma also has been stable. We discussed the above impression, plan and recommendations during the visit. \par \par As for migraines, renewed Ajovy \par \par Counseling represented more then 50% of the 20 minute visit time

## 2023-07-05 NOTE — HISTORY OF PRESENT ILLNESS
[FreeTextEntry1] : Patient is a 43 year old right handed woman with hand tremor with a good response from last Botox injections. \par \par Last visit no finger weakness (this has happened before). No erythema or redness. Lasted about three months. Her tremors are less and her writing has improved as well. Tremor is worse with anxiety. \par \par Her foramen magnum meningioma is stable on recent imaging. Has been getting more migraines, got Ajovy, which is working. Given monthly by her .\par \par Last injections worked, but for 1 month it hurt near the site, now still when holding cup or the like (she points to more midline than the injections).

## 2023-07-05 NOTE — PHYSICAL EXAM
[Person] : oriented to person [Place] : oriented to place [Time] : oriented to time [Motor Handedness Right-Handed] : the patient is right hand dominant [FreeTextEntry1] : Mild bilateral postural tremor mostly in R>L, but since injections, right tremors are less than left. No myoclonus (including startle myoclonus) seen. Gait normal. \par \par Mild pain to palpation right forearm, mildline near elbow

## 2023-08-01 ENCOUNTER — RESULT REVIEW (OUTPATIENT)
Age: 43
End: 2023-08-01

## 2023-08-01 ENCOUNTER — APPOINTMENT (OUTPATIENT)
Dept: NEUROLOGY | Facility: CLINIC | Age: 43
End: 2023-08-01
Payer: MEDICAID

## 2023-08-01 VITALS
WEIGHT: 160 LBS | BODY MASS INDEX: 29.44 KG/M2 | DIASTOLIC BLOOD PRESSURE: 77 MMHG | HEIGHT: 62 IN | SYSTOLIC BLOOD PRESSURE: 111 MMHG | HEART RATE: 67 BPM

## 2023-08-01 DIAGNOSIS — R56.9 UNSPECIFIED CONVULSIONS: ICD-10-CM

## 2023-08-01 PROCEDURE — 99214 OFFICE O/P EST MOD 30 MIN: CPT

## 2023-08-01 NOTE — PHYSICAL EXAM
[FreeTextEntry1] : General: left>right lateral tongue bite Constitutional: alert and in no acute distress.  Psychiatric: affect normal, insight and judgment intact. Neurologic:  Orientation: oriented to person, place, and time  Attention: normal concentrating ability and attention was not decreased.  Language: no difficulty naming common objects, repeating a phrase, writing a sentence; fluency, comprehension, and reading intact.  Fund of knowledge: displays adequate knowledge of personal past history.  Cranial Nerves: visual acuity intact bilaterally, visual fields full to confrontation, pupils equal round and reactive to light, extraocular motion intact, facial sensation intact symmetrically, face symmetrical, hearing was intact bilaterally, tongue and palate midline, head turning and shoulder shrug symmetric and there was no tongue deviation with protrusion.  Motor: muscle tone was normal in all four extremities, muscle strength was normal in all four extremities and normal bulk in all four extremities.  Coordination:. normal gait. balance was intact. there was no past-pointing. essential tremor present.  Deep tendon reflexes:  Biceps right 2+. Biceps left 2+.   Triceps right 2+. Triceps left 2+.   Brachioradialis right 2+. Brachioradialis left 2+.   Patella right 2+. Patella left 2+.   Ankle jerk right 2+. Ankle jerk left 2+.  Eyes: the sclera and conjunctiva were normal.  Neck: the appearance of the neck was normal.  Musculoskeletal: no clubbing or cyanosis of the fingernails.  Skin: no lesions, rash

## 2023-08-01 NOTE — HISTORY OF PRESENT ILLNESS
[FreeTextEntry1] : 43 F with essential tremor, anxiety, foramen magnum meningioma, migraines h/o myoclonus described x since 30s. Episode in [place], LOC, gurgling, tongue bite, found by sister in public BR. Second event with fever, with rolling eyes, tongue bite.  Possible convulsion second hand description.  On return to [facility] - on transfer had 3rd+additoinal GTCS events x3-5min observed by mali Godfrey MD. Sent to [place] ER. N/V.  Desat 89%, FSG normal on plane. Sleep deprived.  Labs: nl in ER 7/2023,  CT 7/2023 nl  MRI brain 10/2022  SocHx: no toxic, no TOB/ETOH   FmHx: Son Harika Claros with autism and seizures. Fa with tremor.  ROS:  anxiety, no depression  Rx: citalopram for anxiety 10mg; LEV 500mg bid new

## 2023-08-01 NOTE — DISCUSSION/SUMMARY
[Myoclonic] : myoclonic [Generalized] : generalized  [Idiopathic] : idiopathic  [Generalized or Multifocal] : generalized or multifocal [Risks Associated with D[place] Law] : As per my usual protocol, the patient was advised in regards to risks and driving privileges associated with the [place] Guidelines.  [Safety Recommendations] : The patient was advised in regards to the risk of seizures and general seizure safety recommendations including not to be bathing alone, climbing to high places and operating heavy machinery. [Compliance with Medications] : The importance of compliance with medications was reinforced. [Medication Side Effects] : High frequency and serious potential medication adverse effects were reviewed with the patient, including but not exclusive to psychiatric effects.  Information sheets on medication side effects were made available to the patient in our clinic.  The patient or advocate agrees to notify us for any concerns. [Sleep Hygiene/Sleep Disruption Risks] : Sleep hygiene and the risks of sleep disruption were discussed. [Risk of Death] : Risk of death associated with seizures / SUDEP was discussed. [FreeTextEntry1] : New onset GTCS/convulsions, in context prior myoclonus x 30's. Son with autism, epilepsy (seizure onset age 2 with fever). On LEV 500mg bid. Monitor mood.  Some h/o anxiety.  Sz precautions, No driving allowed -d/w pt / hus 1yr DMV recommendation. Safety discussed Med ADRs  x time 45min

## 2023-08-23 ENCOUNTER — APPOINTMENT (OUTPATIENT)
Dept: MRI IMAGING | Facility: CLINIC | Age: 43
End: 2023-08-23
Payer: MEDICAID

## 2023-08-23 ENCOUNTER — OUTPATIENT (OUTPATIENT)
Dept: OUTPATIENT SERVICES | Facility: HOSPITAL | Age: 43
LOS: 1 days | End: 2023-08-23
Payer: MEDICAID

## 2023-08-23 DIAGNOSIS — G40.309 GENERALIZED IDIOPATHIC EPILEPSY AND EPILEPTIC SYNDROMES, NOT INTRACTABLE, WITHOUT STATUS EPILEPTICUS: ICD-10-CM

## 2023-08-23 PROCEDURE — A9585: CPT

## 2023-08-23 PROCEDURE — 70553 MRI BRAIN STEM W/O & W/DYE: CPT | Mod: 26

## 2023-08-23 PROCEDURE — 70553 MRI BRAIN STEM W/O & W/DYE: CPT

## 2023-08-23 PROCEDURE — 76377 3D RENDER W/INTRP POSTPROCES: CPT

## 2023-09-21 ENCOUNTER — APPOINTMENT (OUTPATIENT)
Dept: NEUROLOGY | Facility: CLINIC | Age: 43
End: 2023-09-21
Payer: MEDICAID

## 2023-09-21 ENCOUNTER — NON-APPOINTMENT (OUTPATIENT)
Age: 43
End: 2023-09-21

## 2023-09-21 PROCEDURE — 95816 EEG AWAKE AND DROWSY: CPT

## 2023-09-22 PROCEDURE — 95708 EEG WO VID EA 12-26HR UNMNTR: CPT

## 2023-09-23 PROCEDURE — 95700 EEG CONT REC W/VID EEG TECH: CPT

## 2023-09-23 PROCEDURE — 95721 EEG PHY/QHP>36<60 HR W/O VID: CPT

## 2023-09-23 PROCEDURE — 95708 EEG WO VID EA 12-26HR UNMNTR: CPT

## 2023-11-01 ENCOUNTER — APPOINTMENT (OUTPATIENT)
Dept: SPINE | Facility: CLINIC | Age: 43
End: 2023-11-01
Payer: MEDICAID

## 2023-11-01 ENCOUNTER — APPOINTMENT (OUTPATIENT)
Dept: NEUROLOGY | Facility: CLINIC | Age: 43
End: 2023-11-01
Payer: MEDICAID

## 2023-11-01 VITALS
WEIGHT: 158 LBS | DIASTOLIC BLOOD PRESSURE: 76 MMHG | SYSTOLIC BLOOD PRESSURE: 118 MMHG | OXYGEN SATURATION: 100 % | HEART RATE: 89 BPM | HEIGHT: 62 IN | BODY MASS INDEX: 29.08 KG/M2

## 2023-11-01 VITALS
HEIGHT: 62 IN | BODY MASS INDEX: 29.08 KG/M2 | WEIGHT: 158 LBS | DIASTOLIC BLOOD PRESSURE: 84 MMHG | HEART RATE: 86 BPM | SYSTOLIC BLOOD PRESSURE: 123 MMHG

## 2023-11-01 PROCEDURE — 99212 OFFICE O/P EST SF 10 MIN: CPT

## 2023-11-01 PROCEDURE — 99213 OFFICE O/P EST LOW 20 MIN: CPT

## 2023-11-01 RX ORDER — LEVETIRACETAM 500 MG/1
500 TABLET, FILM COATED ORAL TWICE DAILY
Qty: 180 | Refills: 1 | Status: DISCONTINUED | COMMUNITY
Start: 2023-08-01 | End: 2023-11-01

## 2023-11-01 RX ORDER — LEVETIRACETAM 750 MG/1
750 TABLET, FILM COATED ORAL TWICE DAILY
Qty: 60 | Refills: 5 | Status: DISCONTINUED | COMMUNITY
Start: 2023-11-01 | End: 2023-11-01

## 2023-11-06 ENCOUNTER — APPOINTMENT (OUTPATIENT)
Dept: NEUROLOGY | Facility: CLINIC | Age: 43
End: 2023-11-06
Payer: MEDICAID

## 2023-11-06 VITALS — HEART RATE: 89 BPM | SYSTOLIC BLOOD PRESSURE: 114 MMHG | DIASTOLIC BLOOD PRESSURE: 80 MMHG

## 2023-11-06 VITALS — SYSTOLIC BLOOD PRESSURE: 114 MMHG | HEART RATE: 89 BPM | DIASTOLIC BLOOD PRESSURE: 80 MMHG

## 2023-11-06 PROCEDURE — 64642 CHEMODENERV 1 EXTREMITY 1-4: CPT

## 2023-11-06 PROCEDURE — 99214 OFFICE O/P EST MOD 30 MIN: CPT | Mod: 25

## 2024-01-17 ENCOUNTER — APPOINTMENT (OUTPATIENT)
Dept: NEUROLOGY | Facility: CLINIC | Age: 44
End: 2024-01-17

## 2024-01-29 ENCOUNTER — APPOINTMENT (OUTPATIENT)
Dept: NEUROLOGY | Facility: CLINIC | Age: 44
End: 2024-01-29

## 2024-01-30 RX ORDER — ONABOTULINUMTOXINA 100 [USP'U]/1
100 INJECTION, POWDER, LYOPHILIZED, FOR SOLUTION INTRADERMAL; INTRAMUSCULAR
Qty: 1 | Refills: 3 | Status: ACTIVE | COMMUNITY
Start: 2020-05-27 | End: 1900-01-01

## 2024-02-01 ENCOUNTER — NON-APPOINTMENT (OUTPATIENT)
Age: 44
End: 2024-02-01

## 2024-02-05 ENCOUNTER — APPOINTMENT (OUTPATIENT)
Dept: NEUROLOGY | Facility: CLINIC | Age: 44
End: 2024-02-05
Payer: MEDICAID

## 2024-02-05 ENCOUNTER — RX RENEWAL (OUTPATIENT)
Age: 44
End: 2024-02-05

## 2024-02-05 PROCEDURE — 99213 OFFICE O/P EST LOW 20 MIN: CPT

## 2024-02-05 PROCEDURE — G2211 COMPLEX E/M VISIT ADD ON: CPT | Mod: NC,1L

## 2024-02-05 NOTE — HISTORY OF PRESENT ILLNESS
[FreeTextEntry1] : UPDATE:  Ms Alexander Bryant is here today for a scheduled follow up office visit and is accompanied by her son (Deshawn) and father Recent convulsion 1/2024, per hus, amnestic, no tongue bite.  Denies missed Rx of late. Some stress. x1min  HA taking ajovy. better with Rx.  occ pain. No swaying when walking,sitting , cooking.  occ feels heavy sensation of head falling backwards while watching TV, no dizziness  She takes ASM at 10 am and 10pm  Around noon her symptoms intensify for 1-2hours Levetiracetam 500mg ER BID improved side effects   mood okay  HPI:  43 F with essential tremor, anxiety, foramen magnum meningioma, migraines h/o myoclonus described x since 30s. Episode in [place], LOC, gurgling, tongue bite, found by sister in public BR. Second event with fever, with rolling eyes, tongue bite.  Possible convulsion second hand description.  On return to [facility] - on transfer had 3rd+additoinal GTCS events x3-5min observed by mali Godfrey MD. Sent to [place] ER. Last 7/2023 in plane N/V.  Desat 89%, FSG normal on plane. Sleep deprived.  Labs: nl in ER 7/2023,  CT 7/2023 nl  MRI brain 10/2022  SocHx: no toxic, no TOB/ETOH   FmHx: Son Harika Claros with autism and seizures. Fa with tremor.  ROS:  anxiety, no depression  Rx: citalopram for anxiety 10mg; LEV 500mg bid new

## 2024-02-05 NOTE — DISCUSSION/SUMMARY
[FreeTextEntry1] : New onset GTCS/convulsions, in context prior myoclonus x 30's.   Son with autism, epilepsy (seizure onset age 2 with fever). On LEV 500mg bid baseline. Monitor mood.  Some h/o anxiety.  R/o ES vs PNES AEEG nl  Plan  switched  Levetiracetam to ER version 500 mg BID increase to 500/1000mg reviewed seizure risks/safety Proscribed driving [place] Follow  up in 4-6 months   [Myoclonic] : myoclonic [Generalized] : generalized  [Idiopathic] : idiopathic  [Generalized or Multifocal] : generalized or multifocal [Risks Associated with D[place] Law] : As per my usual protocol, the patient was advised in regards to risks and driving privileges associated with the [place] Guidelines.  [Safety Recommendations] : The patient was advised in regards to the risk of seizures and general seizure safety recommendations including not to be bathing alone, climbing to high places and operating heavy machinery. [Compliance with Medications] : The importance of compliance with medications was reinforced. [Medication Side Effects] : High frequency and serious potential medication adverse effects were reviewed with the patient, including but not exclusive to psychiatric effects.  Information sheets on medication side effects were made available to the patient in our clinic.  The patient or advocate agrees to notify us for any concerns. [Sleep Hygiene/Sleep Disruption Risks] : Sleep hygiene and the risks of sleep disruption were discussed. [Risk of Death] : Risk of death associated with seizures / SUDEP was discussed.

## 2024-02-14 ENCOUNTER — APPOINTMENT (OUTPATIENT)
Dept: NEUROLOGY | Facility: CLINIC | Age: 44
End: 2024-02-14
Payer: MEDICAID

## 2024-02-14 PROCEDURE — 64642 CHEMODENERV 1 EXTREMITY 1-4: CPT

## 2024-02-14 PROCEDURE — 99214 OFFICE O/P EST MOD 30 MIN: CPT | Mod: 25

## 2024-02-14 NOTE — HISTORY OF PRESENT ILLNESS
[FreeTextEntry1] : Patient is a 43 year old right handed woman with hand tremor with a good response from last Botox injections.   ET tremor since age of 32 -33 Strong family history of tremor : father grandmother Last visit no finger weakness (this has happened before). No erythema or redness. Lasted about three months. Her tremors are less and her writing has improved as well. Tremor is worse with anxiety.   Her foramen magnum meningioma is stable on recent imaging. Has been getting more migraines, got Ajovy, which is working. Given monthly by her . Since last visit had seizures, now being followed by epilepsy. Now on Keppra ER, seizure-free since. Most recent MRI in August 30, 2023 was stable.  she is currently taking  propanol 60mg ER for the tremor, she feels that helps with the tremor    Last injections worked, but for 1 month it hurt near the site, now still when holding cup or the like (she points to more midline than the injections).  ET is worse now

## 2024-02-14 NOTE — PROCEDURE
[FreeTextEntry1] : The patient received injections with botulinum toxin A (botox), diluted at 5 units/0.1 cc via a 30 ga needle into the following sites, in the usual antiseptic manner. Possible side effects including over-weakening, dysphagia, bleeding, and local infection were discussed.  right extensor carpi radialis muscle: 5/1 right extensor carpi ulnaris muscle:  5/1  Total injected = 10, Discarded = 90, Total used = 100 The patient tolerated the procedure well, with no complications

## 2024-02-14 NOTE — DISCUSSION/SUMMARY
[FreeTextEntry1] : In summary, Mrs. Bryant is a 43 year old right handed woman with dystonia.  1. Botox injections were done as per procedure note. Patient tolerated well the procedure. Follow up in 3 months, sooner if new problems arise. 2. As for the myoclonus, exam normal and seems to have improved, will continue to monitor. Her meningioma also has been stable. We discussed the above impression, plan and recommendations during the visit.  3. Follow with epilepsy 4. As for migraines, continue Ajovy.  5. Propranolol for ET.   Counseling represented more then 50% of the 30 minute visit time

## 2024-02-14 NOTE — PHYSICAL EXAM
[Person] : oriented to person [Place] : oriented to place [Time] : oriented to time [Motor Handedness Right-Handed] : the patient is right hand dominant [FreeTextEntry1] : Mild bilateral postural tremor mostly in R>L, but since injections, right tremors are less than left. No myoclonus (including startle myoclonus) seen. Gait normal.   Bilateral essential tremor in arms.

## 2024-05-13 ENCOUNTER — NON-APPOINTMENT (OUTPATIENT)
Age: 44
End: 2024-05-13

## 2024-06-04 RX ORDER — PROPRANOLOL HYDROCHLORIDE 60 MG/1
60 CAPSULE, EXTENDED RELEASE ORAL
Qty: 30 | Refills: 5 | Status: ACTIVE | COMMUNITY
Start: 2023-11-06 | End: 1900-01-01

## 2024-06-12 ENCOUNTER — APPOINTMENT (OUTPATIENT)
Dept: NEUROLOGY | Facility: CLINIC | Age: 44
End: 2024-06-12
Payer: MEDICAID

## 2024-06-12 VITALS
HEIGHT: 62 IN | HEART RATE: 68 BPM | DIASTOLIC BLOOD PRESSURE: 89 MMHG | WEIGHT: 168 LBS | SYSTOLIC BLOOD PRESSURE: 129 MMHG | BODY MASS INDEX: 30.91 KG/M2

## 2024-06-12 DIAGNOSIS — G25.0 ESSENTIAL TREMOR: ICD-10-CM

## 2024-06-12 DIAGNOSIS — G40.309 GENERALIZED IDIOPATHIC EPILEPSY AND EPILEPTIC SYNDROMES, NOT INTRACTABLE, W/OUT STATUS EPILEPTICUS: ICD-10-CM

## 2024-06-12 DIAGNOSIS — G25.3 MYOCLONUS: ICD-10-CM

## 2024-06-12 DIAGNOSIS — D32.9 BENIGN NEOPLASM OF MENINGES, UNSPECIFIED: ICD-10-CM

## 2024-06-12 DIAGNOSIS — G25.89 OTHER SPECIFIED EXTRAPYRAMIDAL AND MOVEMENT DISORDERS: ICD-10-CM

## 2024-06-12 PROCEDURE — 99214 OFFICE O/P EST MOD 30 MIN: CPT | Mod: 25

## 2024-06-12 PROCEDURE — 64642 CHEMODENERV 1 EXTREMITY 1-4: CPT

## 2024-06-12 RX ORDER — FREMANEZUMAB-VFRM 225 MG/1.5ML
225 INJECTION SUBCUTANEOUS
Qty: 1 | Refills: 5 | Status: ACTIVE | COMMUNITY
Start: 1900-01-01 | End: 1900-01-01

## 2024-06-12 RX ORDER — LEVETIRACETAM 500 MG/1
500 TABLET, FILM COATED, EXTENDED RELEASE ORAL
Qty: 270 | Refills: 1 | Status: ACTIVE | COMMUNITY
Start: 2023-11-01 | End: 1900-01-01

## 2024-06-12 NOTE — HISTORY OF PRESENT ILLNESS
[FreeTextEntry1] : Patient is a 44 year old right handed woman with hand tremor with a good response from last Botox injections.   ET tremor since age of 32 -33 Strong family history of tremor : father grandmother Last visit no finger weakness (this has happened before). No erythema or redness. Lasted about three months. Her tremors are less and her writing has improved as well. Tremor is worse with anxiety.   Her foramen magnum meningioma is stable on recent imaging. Has been getting more migraines, got Ajovy, which is working. Given monthly by her . Since last visit had seizures, now being followed by epilepsy. Now on Keppra ER, seizure-free since. Most recent MRI in August 30, 2023 was stable.  she is currently taking propanol 60mg ER for the tremor, she feels that helps with the tremor  Last injections worked, no pain this time 24

## 2024-06-12 NOTE — DISCUSSION/SUMMARY
[FreeTextEntry1] : In summary, Mrs. Bryant is a 43 year old right handed woman with dystonia.  1. Botox injections were done as per procedure note. Patient tolerated well the procedure. Follow up in 3 months, sooner if new problems arise. Propranolol is helping, will continue 2. As for the myoclonus, exam normal and seems to have improved, will continue to monitor. Her meningioma also has been stable. We discussed the above impression, plan and recommendations during the visit.  3. Follow with epilepsy 4. As for migraines, continue Ajovy.   We discussed the above impression, plan and recommendations during the visit. Counseling represented more then 50% of the 30 minute visit time.

## 2024-07-26 ENCOUNTER — APPOINTMENT (OUTPATIENT)
Dept: MRI IMAGING | Facility: CLINIC | Age: 44
End: 2024-07-26

## 2024-07-26 PROCEDURE — 70553 MRI BRAIN STEM W/O & W/DYE: CPT | Mod: 26

## 2024-09-11 ENCOUNTER — NON-APPOINTMENT (OUTPATIENT)
Age: 44
End: 2024-09-11

## 2024-09-18 ENCOUNTER — APPOINTMENT (OUTPATIENT)
Dept: NEUROLOGY | Facility: CLINIC | Age: 44
End: 2024-09-18

## 2024-10-01 ENCOUNTER — APPOINTMENT (OUTPATIENT)
Dept: NEUROLOGY | Facility: CLINIC | Age: 44
End: 2024-10-01
Payer: MEDICAID

## 2024-10-01 VITALS
HEIGHT: 62 IN | SYSTOLIC BLOOD PRESSURE: 127 MMHG | WEIGHT: 168 LBS | HEART RATE: 68 BPM | DIASTOLIC BLOOD PRESSURE: 91 MMHG | BODY MASS INDEX: 30.91 KG/M2

## 2024-10-01 DIAGNOSIS — G40.309 GENERALIZED IDIOPATHIC EPILEPSY AND EPILEPTIC SYNDROMES, NOT INTRACTABLE, W/OUT STATUS EPILEPTICUS: ICD-10-CM

## 2024-10-01 DIAGNOSIS — R56.9 UNSPECIFIED CONVULSIONS: ICD-10-CM

## 2024-10-01 PROCEDURE — G2211 COMPLEX E/M VISIT ADD ON: CPT | Mod: NC

## 2024-10-01 PROCEDURE — 99213 OFFICE O/P EST LOW 20 MIN: CPT

## 2024-10-01 NOTE — DISCUSSION/SUMMARY
[Myoclonic] : myoclonic [Generalized] : generalized  [Idiopathic] : idiopathic  [Generalized or Multifocal] : generalized or multifocal [[place]isks Associated with D[place] Law] : As per my usual protocol, the patient was advised in regards to risks and driving privileges associated with the [place] Guidelines.  [Safety Recommendations] : The patient was advised in regards to the risk of seizures and general seizure safety recommendations including not to be bathing alone, climbing to high places and operating heavy machinery. [Compliance with Medications] : The importance of compliance with medications was reinforced. [Medication Side Effects] : High frequency and serious potential medication adverse effects were reviewed with the patient, including but not exclusive to psychiatric effects.  Information sheets on medication side effects were made available to the patient in our clinic.  The patient or advocate agrees to notify us for any concerns. [Sleep Hygiene/Sleep Disruption Risks] : Sleep hygiene and the risks of sleep disruption were discussed. [Risk of Death] : Risk of death associated with seizures / SUDEP was discussed. [FreeTextEntry1] : New onset GTCS/convulsions, in context prior myoclonus x 30's.   Son with autism, epilepsy (seizure onset age 2 with fever). On LEV  Monitor mood, currently stable.   Some h/o anxiety.  R/o ES vs PNES (seems less likely) AEEG nl Exam normal except mild tremor  Plan  Levetiracetam to ER 500mg / 1000mg reviewed seizure risks/safety compliance reinforced Driving as per [facility] DMV clearance Labs/levels  Follow  up in 4-6 months

## 2024-10-01 NOTE — CONSULT LETTER
[Dear  ___] : Dear  [unfilled], [( Thank you for referring [unfilled] for consultation for _____ )] : Thank you for referring [unfilled] for consultation for [unfilled] [Please see my note below.] : Please see my note below. [Consult Closing:] : Thank you very much for allowing me to participate in the care of this patient.  If you have any questions, please do not hesitate to contact me. [Sincerely,] : Sincerely, [FreeTextEntry2] : Tosin Ortiz MD [facility] [id number] [id number] [FreeTextEntry3] : William Park MD, CRAIG Board Certified: Neurology, Clinical Neurophysiology, Epilepsy , Department of Neurology Epilepsy Fellowship  [facility] NPI: [id number]

## 2024-10-01 NOTE — HISTORY OF PRESENT ILLNESS
[FreeTextEntry1] : UPDATE:  Ms Alexander Bryant is here today for a scheduled follow up office visit  No further seizures Recent convulsion 1/2024, per hus, amnestic, no tongue bite.  Denies missed Rx of late. Some stress. x1min DMV cleared return to driving p 6mo. Levetiracetam 500mg ER BID improved side effects   mood okay She takes ASM at 10 am and 10pm  Around noon her symptoms intensify for 1-2hours  Still with buzzing in R ear and vertigo. Seeing ENT.  tried prn meclizine HA taking ajovy. better with Rx. occ pain. heaviness on top of head  ROS:  mood stable, sleep ok.  HPI:  44 F with essential tremor, anxiety, foramen magnum meningioma, migraines h/o myoclonus described x since 30s. Episode in [facility], LOC, gurgling, tongue bite, found by sister in public [place]. Second event with fever, with rolling eyes, tongue bite.  Possible convulsion second hand description.  On return to [facility] - on transfer had 3rd+additoinal GTCS events x3-5min observed by mali Godfrey MD. Sent to [place] ER. Last 7/2023 in plane N/V.  Desat 89%, FSG normal on plane. Sleep deprived.  Labs: nl in ER 7/2023,  CT 7/2023 nl  MRI brain 10/2022  SocHx: no toxic, no TOB/ETOH. part time   FmHx: Son Harika Claros with autism and seizures.  [place] older child in 20's. Fa with tremor.  ROS:  anxiety, no depression  Rx: citalopram for anxiety 10mg; LEV 500mg bid new

## 2024-10-04 LAB — LEVETIRACETAM SERPL-MCNC: 25.7 UG/ML

## 2024-10-11 ENCOUNTER — APPOINTMENT (OUTPATIENT)
Dept: NEUROLOGY | Facility: CLINIC | Age: 44
End: 2024-10-11

## 2024-11-06 ENCOUNTER — APPOINTMENT (OUTPATIENT)
Dept: NEUROLOGY | Facility: CLINIC | Age: 44
End: 2024-11-06
Payer: COMMERCIAL

## 2024-11-06 VITALS
HEART RATE: 71 BPM | HEIGHT: 62 IN | WEIGHT: 168 LBS | SYSTOLIC BLOOD PRESSURE: 118 MMHG | DIASTOLIC BLOOD PRESSURE: 84 MMHG | BODY MASS INDEX: 30.91 KG/M2

## 2024-11-06 DIAGNOSIS — G25.0 ESSENTIAL TREMOR: ICD-10-CM

## 2024-11-06 PROCEDURE — 99203 OFFICE O/P NEW LOW 30 MIN: CPT | Mod: 25

## 2024-11-06 PROCEDURE — 64642 CHEMODENERV 1 EXTREMITY 1-4: CPT

## 2024-11-14 ENCOUNTER — APPOINTMENT (OUTPATIENT)
Dept: OTOLARYNGOLOGY | Facility: CLINIC | Age: 44
End: 2024-11-14
Payer: COMMERCIAL

## 2024-11-14 VITALS
HEIGHT: 62 IN | WEIGHT: 170 LBS | DIASTOLIC BLOOD PRESSURE: 85 MMHG | SYSTOLIC BLOOD PRESSURE: 127 MMHG | BODY MASS INDEX: 31.28 KG/M2 | HEART RATE: 59 BPM

## 2024-11-14 DIAGNOSIS — H91.91 UNSPECIFIED HEARING LOSS, RIGHT EAR: ICD-10-CM

## 2024-11-14 DIAGNOSIS — H93.291 OTHER ABNORMAL AUDITORY PERCEPTIONS, RIGHT EAR: ICD-10-CM

## 2024-11-14 PROCEDURE — 99204 OFFICE O/P NEW MOD 45 MIN: CPT | Mod: 25

## 2024-11-14 PROCEDURE — 92504 EAR MICROSCOPY EXAMINATION: CPT

## 2024-12-03 DIAGNOSIS — G40.309 GENERALIZED IDIOPATHIC EPILEPSY AND EPILEPTIC SYNDROMES, NOT INTRACTABLE, W/OUT STATUS EPILEPTICUS: ICD-10-CM

## 2025-02-11 NOTE — ED PROVIDER NOTE - NS ED MD DISPO DISCHARGE CCDA
The patient location is: ***  The chief complaint leading to consultation is: ***    Visit type: {TELE AUDIOVISUAL:21501}    Face to Face time with patient: ***  *** minutes of total time spent on the encounter, which includes face to face time and non-face to face time preparing to see the patient (eg, review of tests), Obtaining and/or reviewing separately obtained history, Documenting clinical information in the electronic or other health record, Independently interpreting results (not separately reported) and communicating results to the patient/family/caregiver, or Care coordination (not separately reported).         Each patient to whom he or she provides medical services by telemedicine is:  (1) informed of the relationship between the physician and patient and the respective role of any other health care provider with respect to management of the patient; and (2) notified that he or she may decline to receive medical services by telemedicine and may withdraw from such care at any time.    Notes:             Patient/Caregiver provided printed discharge information.

## 2025-02-12 ENCOUNTER — NON-APPOINTMENT (OUTPATIENT)
Age: 45
End: 2025-02-12

## 2025-02-12 ENCOUNTER — APPOINTMENT (OUTPATIENT)
Dept: NEUROLOGY | Facility: CLINIC | Age: 45
End: 2025-02-12

## 2025-02-12 ENCOUNTER — APPOINTMENT (OUTPATIENT)
Dept: OBGYN | Facility: CLINIC | Age: 45
End: 2025-02-12
Payer: COMMERCIAL

## 2025-02-12 VITALS
HEART RATE: 73 BPM | DIASTOLIC BLOOD PRESSURE: 93 MMHG | SYSTOLIC BLOOD PRESSURE: 137 MMHG | HEIGHT: 62 IN | WEIGHT: 169 LBS | BODY MASS INDEX: 31.1 KG/M2

## 2025-02-12 VITALS — DIASTOLIC BLOOD PRESSURE: 79 MMHG | WEIGHT: 167 LBS | SYSTOLIC BLOOD PRESSURE: 113 MMHG | BODY MASS INDEX: 30.54 KG/M2

## 2025-02-12 DIAGNOSIS — G25.0 ESSENTIAL TREMOR: ICD-10-CM

## 2025-02-12 DIAGNOSIS — G25.3 MYOCLONUS: ICD-10-CM

## 2025-02-12 DIAGNOSIS — G25.89 OTHER SPECIFIED EXTRAPYRAMIDAL AND MOVEMENT DISORDERS: ICD-10-CM

## 2025-02-12 DIAGNOSIS — N83.291 OTHER OVARIAN CYST, RIGHT SIDE: ICD-10-CM

## 2025-02-12 DIAGNOSIS — D32.9 BENIGN NEOPLASM OF MENINGES, UNSPECIFIED: ICD-10-CM

## 2025-02-12 LAB
HCG UR QL: NEGATIVE
QUALITY CONTROL: YES

## 2025-02-12 PROCEDURE — 99204 OFFICE O/P NEW MOD 45 MIN: CPT | Mod: 25

## 2025-02-12 PROCEDURE — 76830 TRANSVAGINAL US NON-OB: CPT

## 2025-02-12 PROCEDURE — 81025 URINE PREGNANCY TEST: CPT

## 2025-02-12 PROCEDURE — 99213 OFFICE O/P EST LOW 20 MIN: CPT | Mod: 25

## 2025-02-12 PROCEDURE — 64642 CHEMODENERV 1 EXTREMITY 1-4: CPT

## 2025-02-27 ENCOUNTER — APPOINTMENT (OUTPATIENT)
Dept: OBGYN | Facility: CLINIC | Age: 45
End: 2025-02-27

## 2025-03-04 ENCOUNTER — APPOINTMENT (OUTPATIENT)
Dept: OBGYN | Facility: CLINIC | Age: 45
End: 2025-03-04

## 2025-03-27 ENCOUNTER — OUTPATIENT (OUTPATIENT)
Dept: OUTPATIENT SERVICES | Facility: HOSPITAL | Age: 45
LOS: 1 days | End: 2025-03-27
Payer: COMMERCIAL

## 2025-03-27 ENCOUNTER — APPOINTMENT (OUTPATIENT)
Dept: ULTRASOUND IMAGING | Facility: CLINIC | Age: 45
End: 2025-03-27
Payer: COMMERCIAL

## 2025-03-27 DIAGNOSIS — N83.291 OTHER OVARIAN CYST, RIGHT SIDE: ICD-10-CM

## 2025-03-27 PROCEDURE — 76830 TRANSVAGINAL US NON-OB: CPT

## 2025-03-27 PROCEDURE — 76856 US EXAM PELVIC COMPLETE: CPT

## 2025-03-27 PROCEDURE — 76856 US EXAM PELVIC COMPLETE: CPT | Mod: 26

## 2025-03-27 PROCEDURE — 76830 TRANSVAGINAL US NON-OB: CPT | Mod: 26

## 2025-04-03 ENCOUNTER — APPOINTMENT (OUTPATIENT)
Dept: OBGYN | Facility: CLINIC | Age: 45
End: 2025-04-03
Payer: COMMERCIAL

## 2025-04-03 DIAGNOSIS — N83.202 UNSPECIFIED OVARIAN CYST, LEFT SIDE: ICD-10-CM

## 2025-04-03 PROCEDURE — 99213 OFFICE O/P EST LOW 20 MIN: CPT | Mod: 93

## 2025-04-03 RX ORDER — DROSPIRENONE AND ETHINYL ESTRADIOL 0.02-3(28)
3-0.02 KIT ORAL
Qty: 1 | Refills: 0 | Status: ACTIVE | COMMUNITY
Start: 2025-04-03 | End: 1900-01-01

## 2025-04-22 ENCOUNTER — APPOINTMENT (OUTPATIENT)
Dept: NEUROLOGY | Facility: CLINIC | Age: 45
End: 2025-04-22
Payer: COMMERCIAL

## 2025-04-22 VITALS
HEART RATE: 74 BPM | DIASTOLIC BLOOD PRESSURE: 69 MMHG | SYSTOLIC BLOOD PRESSURE: 111 MMHG | BODY MASS INDEX: 30.91 KG/M2 | HEIGHT: 62 IN | WEIGHT: 168 LBS

## 2025-04-22 DIAGNOSIS — G40.309 GENERALIZED IDIOPATHIC EPILEPSY AND EPILEPTIC SYNDROMES, NOT INTRACTABLE, W/OUT STATUS EPILEPTICUS: ICD-10-CM

## 2025-04-22 DIAGNOSIS — G25.0 ESSENTIAL TREMOR: ICD-10-CM

## 2025-04-22 PROCEDURE — 99213 OFFICE O/P EST LOW 20 MIN: CPT

## 2025-05-20 ENCOUNTER — APPOINTMENT (OUTPATIENT)
Dept: OTOLARYNGOLOGY | Facility: CLINIC | Age: 45
End: 2025-05-20

## 2025-05-28 ENCOUNTER — APPOINTMENT (OUTPATIENT)
Dept: NEUROLOGY | Facility: CLINIC | Age: 45
End: 2025-05-28

## 2025-06-09 ENCOUNTER — APPOINTMENT (OUTPATIENT)
Dept: OBGYN | Facility: CLINIC | Age: 45
End: 2025-06-09
Payer: COMMERCIAL

## 2025-06-09 VITALS — SYSTOLIC BLOOD PRESSURE: 137 MMHG | OXYGEN SATURATION: 99 % | DIASTOLIC BLOOD PRESSURE: 90 MMHG | HEART RATE: 72 BPM

## 2025-06-09 PROBLEM — R42 DIZZINESS: Status: ACTIVE | Noted: 2025-06-09

## 2025-06-09 PROCEDURE — 99213 OFFICE O/P EST LOW 20 MIN: CPT

## 2025-06-09 RX ORDER — DROSPIRENONE AND ETHINYL ESTRADIOL 0.02-3(28)
3-0.02 KIT ORAL
Qty: 1 | Refills: 2 | Status: ACTIVE | COMMUNITY
Start: 2025-06-09 | End: 1900-01-01

## 2025-06-24 ENCOUNTER — NON-APPOINTMENT (OUTPATIENT)
Age: 45
End: 2025-06-24

## 2025-06-27 ENCOUNTER — APPOINTMENT (OUTPATIENT)
Dept: NEUROLOGY | Facility: CLINIC | Age: 45
End: 2025-06-27
Payer: COMMERCIAL

## 2025-06-27 VITALS
HEIGHT: 62 IN | WEIGHT: 170 LBS | DIASTOLIC BLOOD PRESSURE: 77 MMHG | SYSTOLIC BLOOD PRESSURE: 130 MMHG | BODY MASS INDEX: 31.28 KG/M2 | HEART RATE: 58 BPM

## 2025-06-27 PROCEDURE — 64642 CHEMODENERV 1 EXTREMITY 1-4: CPT

## 2025-06-27 PROCEDURE — 99213 OFFICE O/P EST LOW 20 MIN: CPT | Mod: 25

## 2025-06-30 NOTE — END OF VISIT
Occupational Therapy Visit    Visit Type: Daily Treatment Note  Visit: 4  Referring Provider: Thomas Espinoza MD  Medical Diagnosis (from order): M25.521 - Right elbow pain  M77.11 - Lateral epicondylitis of right elbow  M77.8 - Right elbow tendinitis     SUBJECTIVE                                                                                                               Patient reports that she tried to use a massager with electrodes over the weekend, but it ended up causing increased pain and nerve irritation into the middle finger.  She states over the weekend she had pain up to 6-7/10.  Today her pain level is improved at 3/10.     Functional Change: Improving ability to perform her exercises comfortably and use her right arm for daily tasks with greater ease     OBJECTIVE                                                                                                                         Palpation  Tenderness at the lateral epicondyle                       Treatment    Cryotherapy: Cold Pack  - Location: right ulnar wrist and lateral elbow/forearm  - Position: sitting  - Duration: 10 minutes    Results: decreased pain  Reaction: no adverse reaction to treatment      Therapeutic Exercise  Passive wrist flexion/extension/ulnar deviation (UD)/Radial deviation (RD)/pronation(pro)/supination(sup)   Passive forearm stretch in elbow extension   Active wrist flexion/extension/ulnar deviation (UD)/Radial deviation (RD) 1 lb x 30  Mallet 1/2lb pronation(pro)/supination(sup) x 30  Shoulder external rotation yellow band 2 x 10     Manual Therapy   Soft tissue mobilization to right elbow (clockwise, counterclockwise, along the extensor mass, perpendicular)   Instrument assisted tissue mobilization to extensor mass and around the lateral epicondyle       Therapeutic Activity  Towel slides (forward/back, circles - each direction) x 20 each   Towel walks x 7   Towel squeeze and lift x 10   Clothespins - red (3 point on,  [Time Spent: ___ minutes] : I have spent [unfilled] minutes of time on the encounter. alternating off) x 1 set        Neuromuscular Re-Education  Yellow therabar wrist flexion/extension (starting elbow flexion into elbow extension with wrist extension; pronation/supination x 15 each   Orange wrist maze x 4   Coins (holding 8 at a time) x 1 set   Silver ball rotation x 2 minutes (no touching)   Red web finger flexion/extension x 15 repetitions each     Home Exercise Program  Access Code: [email or web address]  URL: [email or web address]  Date: 06/09/2025  Prepared by: Stacey Bolton    Program Notes  Ice as instructed per orthopedics.     Exercises  - Seated Elbow Manual Massage Clockwise  - 2-3 x daily - 7 x weekly - 20 reps  - Seated Elbow Manual Massage Counterclockwise  - 2-3 x daily - 7 x weekly - 20 reps  - Seated Elbow Manual Massage Lengthwise  - 2-3 x daily - 7 x weekly - 20 reps  - Seated Elbow Manual Massage Perpendicular  - 2-3 x daily - 7 x weekly - 20 reps  - Standing Wrist Flexion Stretch  - 2-3 x daily - 7 x weekly - 3-5 reps - 20 second hold  - Seated Wrist Extension PROM  - 2-3 x daily - 7 x weekly - 3-5 reps - 20 second hold  - Standing Forearm Pronation and Supination AROM  - 2-3 x daily - 7 x weekly - 1 sets - 20 reps  - Rolling Putty on Table  - 1-2 x daily - 7 x weekly - 2 minutes hold    Access Code: [email or web address]  URL: [email or web address]  Date: 06/30/2025  Prepared by: Stacey Bolton    Program Notes  Ice as instructed per orthopedics.     Exercises  - Seated Elbow Manual Massage Clockwise  - 2-3 x daily - 7 x weekly - 20 reps  - Seated Elbow Manual Massage Counterclockwise  - 2-3 x daily - 7 x weekly - 20 reps  - Seated Elbow Manual Massage Lengthwise  - 2-3 x daily - 7 x weekly - 20 reps  - Seated Elbow Manual Massage Perpendicular  - 2-3 x daily - 7 x weekly - 20 reps  - Standing Wrist Flexion Stretch  - 2-3 x daily - 7 x weekly - 3-5 reps - 20 second hold  - Seated Wrist Extension PROM  - 2-3 x daily - 7 x weekly - 3-5 reps - 20 second  [>50% of the face to face encounter time was spent on counseling and/or coordination of care for ___] : Greater than 50% of the face to face encounter time was spent on counseling and/or coordination of care for [unfilled] hold  - Standing Forearm Pronation and Supination AROM  - 2-3 x daily - 7 x weekly - 1 sets - 20 reps  - Rolling Putty on Table  - 1-2 x daily - 7 x weekly - 2 minutes hold  - Shoulder External Rotation with Anchored Resistance with Towel Under Elbow  - 2 x daily - 7 x weekly - 23 sets - 10 reps  - Putty Squeezes  - 2 x daily - 7 x weekly - 2 sets - 10 reps  - 3-Point Pinch with Putty  - 2 x daily - 7 x weekly - 1 sets - 1-2 reps  - Tip Pinch with Putty  - 2 x daily - 7 x weekly - 1 sets - 1-2 repetitions  Pink putty issued       ASSESSMENT                                                                                                            Patient tolerated upgraded exercises well.  She demonstrated increased soft tissue texture through the lateral elbow with instrument assisted mobilization.  She was able to progress exercises with increased repetitions without pain or discomfort.  She was able to pinch and place the red clothespins and upgrade to the pink putty for gripping and pinching without pain.  New exercises added to home program to increase resistance and encourage endurance exercises.  Next session will continue to focus on soft tissue tightness, light exercises and progressing activities for improvement with functional daily tasks.       Education:   - Results of above outlined education: Verbalizes understanding    PLAN                                                                                                                           Suggestions for next session as indicated: Progress per plan of care           Therapy procedure time and total treatment time can be found documented on the Time Entry flowsheet

## 2025-07-02 ENCOUNTER — APPOINTMENT (OUTPATIENT)
Dept: ULTRASOUND IMAGING | Facility: CLINIC | Age: 45
End: 2025-07-02
Payer: COMMERCIAL

## 2025-07-02 ENCOUNTER — OUTPATIENT (OUTPATIENT)
Dept: OUTPATIENT SERVICES | Facility: HOSPITAL | Age: 45
LOS: 1 days | End: 2025-07-02
Payer: COMMERCIAL

## 2025-07-02 DIAGNOSIS — N83.291 OTHER OVARIAN CYST, RIGHT SIDE: ICD-10-CM

## 2025-07-02 PROCEDURE — 76856 US EXAM PELVIC COMPLETE: CPT | Mod: 26,59

## 2025-07-02 PROCEDURE — 76830 TRANSVAGINAL US NON-OB: CPT

## 2025-07-02 PROCEDURE — 76830 TRANSVAGINAL US NON-OB: CPT | Mod: 26

## 2025-07-02 PROCEDURE — 76856 US EXAM PELVIC COMPLETE: CPT

## 2025-07-08 ENCOUNTER — APPOINTMENT (OUTPATIENT)
Dept: OBGYN | Facility: CLINIC | Age: 45
End: 2025-07-08
Payer: COMMERCIAL

## 2025-07-08 PROBLEM — N83.209 SIMPLE OVARIAN CYST: Status: ACTIVE | Noted: 2025-07-08

## 2025-07-08 PROCEDURE — 99212 OFFICE O/P EST SF 10 MIN: CPT | Mod: 93
